# Patient Record
Sex: FEMALE | Race: WHITE | NOT HISPANIC OR LATINO
[De-identification: names, ages, dates, MRNs, and addresses within clinical notes are randomized per-mention and may not be internally consistent; named-entity substitution may affect disease eponyms.]

---

## 2020-10-15 PROBLEM — Z00.00 ENCOUNTER FOR PREVENTIVE HEALTH EXAMINATION: Status: ACTIVE | Noted: 2020-10-15

## 2020-11-04 DIAGNOSIS — Z82.3 FAMILY HISTORY OF STROKE: ICD-10-CM

## 2020-11-04 DIAGNOSIS — Z63.4 DISAPPEARANCE AND DEATH OF FAMILY MEMBER: ICD-10-CM

## 2020-11-04 DIAGNOSIS — I34.0 NONRHEUMATIC MITRAL (VALVE) INSUFFICIENCY: ICD-10-CM

## 2020-11-04 DIAGNOSIS — G43.909 MIGRAINE, UNSPECIFIED, NOT INTRACTABLE, W/OUT STATUS MIGRAINOSUS: ICD-10-CM

## 2020-11-04 DIAGNOSIS — M48.00 SPINAL STENOSIS, SITE UNSPECIFIED: ICD-10-CM

## 2020-11-04 DIAGNOSIS — R73.03 PREDIABETES.: ICD-10-CM

## 2020-11-04 DIAGNOSIS — E78.5 HYPERLIPIDEMIA, UNSPECIFIED: ICD-10-CM

## 2020-11-04 RX ORDER — ATORVASTATIN CALCIUM 20 MG/1
20 TABLET, FILM COATED ORAL
Qty: 1 | Refills: 0 | Status: ACTIVE | COMMUNITY
Start: 2020-11-04

## 2020-11-04 RX ORDER — OMEPRAZOLE 20 MG/1
20 CAPSULE, DELAYED RELEASE ORAL
Qty: 30 | Refills: 2 | Status: ACTIVE | COMMUNITY
Start: 2020-11-04

## 2020-11-04 RX ORDER — AMLODIPINE BESYLATE 5 MG/1
5 TABLET ORAL DAILY
Qty: 90 | Refills: 1 | Status: ACTIVE | COMMUNITY
Start: 2020-11-04

## 2020-11-04 SDOH — SOCIAL STABILITY - SOCIAL INSECURITY: DISSAPEARANCE AND DEATH OF FAMILY MEMBER: Z63.4

## 2020-11-05 ENCOUNTER — APPOINTMENT (OUTPATIENT)
Dept: ELECTROPHYSIOLOGY | Facility: CLINIC | Age: 85
End: 2020-11-05
Payer: MEDICARE

## 2020-11-05 VITALS
HEIGHT: 62 IN | WEIGHT: 154 LBS | DIASTOLIC BLOOD PRESSURE: 70 MMHG | HEART RATE: 53 BPM | OXYGEN SATURATION: 98 % | BODY MASS INDEX: 28.34 KG/M2 | SYSTOLIC BLOOD PRESSURE: 114 MMHG

## 2020-11-05 DIAGNOSIS — R06.00 DYSPNEA, UNSPECIFIED: ICD-10-CM

## 2020-11-05 DIAGNOSIS — R26.89 OTHER ABNORMALITIES OF GAIT AND MOBILITY: ICD-10-CM

## 2020-11-05 DIAGNOSIS — I48.0 PAROXYSMAL ATRIAL FIBRILLATION: ICD-10-CM

## 2020-11-05 DIAGNOSIS — I10 ESSENTIAL (PRIMARY) HYPERTENSION: ICD-10-CM

## 2020-11-05 PROCEDURE — 93000 ELECTROCARDIOGRAM COMPLETE: CPT

## 2020-11-05 PROCEDURE — 99205 OFFICE O/P NEW HI 60 MIN: CPT | Mod: 25

## 2020-11-05 RX ORDER — BISOPROLOL FUMARATE 5 MG/1
5 TABLET, FILM COATED ORAL DAILY
Qty: 30 | Refills: 0 | Status: ACTIVE | COMMUNITY
Start: 2020-11-05

## 2020-11-05 RX ORDER — HYDROCHLOROTHIAZIDE 12.5 MG/1
12.5 TABLET ORAL DAILY
Qty: 90 | Refills: 3 | Status: DISCONTINUED | COMMUNITY
Start: 2020-11-04 | End: 2020-11-05

## 2020-11-06 PROBLEM — R26.89 IMBALANCE: Status: ACTIVE | Noted: 2020-11-06

## 2020-11-06 PROBLEM — I48.0 PAROXYSMAL ATRIAL FIBRILLATION: Status: ACTIVE | Noted: 2020-11-06

## 2020-11-06 PROBLEM — I10 HTN (HYPERTENSION): Status: ACTIVE | Noted: 2020-11-04

## 2020-11-06 PROBLEM — R06.00 DYSPNEA: Status: ACTIVE | Noted: 2020-11-06

## 2020-11-06 NOTE — DISCUSSION/SUMMARY
[FreeTextEntry1] : CARL WATSON is a 85 year old female with hypertension, hyperlipidemia, obstructive sleep apnea (on CPAP), mitral valve regurgitation, spinal stenosis, pre-diabetes and paroxysmal AF who is referred by Dr. Cruz for evaluation of paroxysmal AF and frequent falls.\par \par Her CHADS2-VASc score is 4 (HTN, Age x2, Female Sex) giving her a significant risk of stroke. She has frequent falls raising concerns for bleeding but her HAS-BLED score is only 1 (Age >65).\par \par On most recent monitoring her AF burden was quite low at 0.03%. Most evidence has suggested that only high burden (KP-RHYTHM study showed that >11% was associated with increased risk of stroke) and duration (KP-RHYTHM study had 5.5 hrs cut off, ASSERT 6 min, other studies of ~24 hrs) were associated with an increased risk of stroke. Therefore, though her CHADS2-VASc score is elevated, her overall burden and maximum duration of AF is very low and so her stroke risk may not be significantly elevated.\par \par She has also had periods of imbalance with monitoring demonstrating periods of pauses of up to 2.2 seconds.\par \par Given the above, I felt it was most reasonable for us to pursue ILR implantation to get a true understanding of her AF burden, and therefore stroke risk. Additionally, it can help determine whether her falls are related to periods of sinus pauses (as she is otherwise asymptomatic).\par \par The risks, benefits, and alternatives were discussed at length. The risks explained include, but are not limited to: pain, bleeding, infection, and theoretical lung or heart damage (1 case report of ILR in pleura was seen). We also reviewed costs associated with monthly monitoring after ILR implantation. The opportunity to ask questions was provided and all were answered to the patient's satisfaction.\par \par At this juncture she would like to discuss options with her family before proceeding with ILR implant.\par \par I agreed with her that she should follow up with a Neurologist regarding her imbalance. She told me she would make an appointment today.\par \par Lastly, given her complaints of chest discomfort and dyspnea, ischemic evaluation with stress testing is reasonable. I will discuss this with Dr. Cruz to see what he thinks as well.\par \par Recommendations:\par - ILR implant to guide stroke prevention strategies\par - Neurology follow up for imbalance\par - Consider stress testing to rule out ischemic disease

## 2020-11-06 NOTE — PHYSICAL EXAM
[General Appearance - Well Developed] : well developed [Normal Appearance] : normal appearance [General Appearance - Well Nourished] : well nourished [Normal Conjunctiva] : the conjunctiva exhibited no abnormalities [Normal Jugular Venous V Waves Present] : normal jugular venous V waves present [Heart Rate And Rhythm] : heart rate and rhythm were normal [Heart Sounds] : normal S1 and S2 [Murmurs] : no murmurs present [Edema] : no peripheral edema present [Respiration, Rhythm And Depth] : normal respiratory rhythm and effort [Exaggerated Use Of Accessory Muscles For Inspiration] : no accessory muscle use [Auscultation Breath Sounds / Voice Sounds] : lungs were clear to auscultation bilaterally [Bowel Sounds] : normal bowel sounds [Abdomen Soft] : soft [Abdomen Tenderness] : non-tender [Abnormal Walk] : normal gait [Nail Clubbing] : no clubbing of the fingernails [Cyanosis, Localized] : no localized cyanosis [Skin Color & Pigmentation] : normal skin color and pigmentation [Skin Turgor] : normal skin turgor [] : no rash [Oriented To Time, Place, And Person] : oriented to person, place, and time [Impaired Insight] : insight and judgment were intact [No Anxiety] : not feeling anxious [FreeTextEntry1] : No thyromegaly.

## 2020-11-06 NOTE — HISTORY OF PRESENT ILLNESS
[FreeTextEntry1] : CARL WATSON is a 85 year old female with hypertension, hyperlipidemia, obstructive sleep apnea (on CPAP), mitral valve regurgitation, spinal stenosis, pre-diabetes and paroxysmal AF who is referred by Dr. Cruz for evaluation of paroxysmal AF and frequent falls.\par \par To summarize her history, she was first evaluated by Dr. Cruz in 11/2019 due to difficulty with blood pressure control. She reports that at times she would develop high blood pressure and there was difficulty in managing it.\par \par She developed palpitations some time last year. They are described as "something is happening" in her chest and feels that there is something "thick" in it. These are associated with symptoms of dyspnea and back pain which last about a few hours and at times a couple of days. She reports that doing chores is difficult and leads to fatigue now.\par \par Given her symptoms Dr. Cruz ordered a 30 day MCOT which revealed that she had an episode of sustained AF lasting about 2 minutes at most with a 0.03% AF burden. He considered starting anticoagulation but she has had 2 falls recently so he was hesitant to start on anticoagulation. She denies having any loss of consciousness. She states that during both episodes she lost her balance with turning and fell. Neither episode was associated with trauma.\par \par She does endorse having dyspnea on exertion at times. However, this is not a daily phenomenon.\par \par Today, she denies any fevers, chills, cough, sweats, chest pain, orthopnea, paroxysmal nocturnal dyspnea, peripheral edema, lightheadedness, syncope, nausea, vomiting, melena, hematochezia, or hematemesis.